# Patient Record
Sex: FEMALE | Race: WHITE | NOT HISPANIC OR LATINO | URBAN - NONMETROPOLITAN AREA
[De-identification: names, ages, dates, MRNs, and addresses within clinical notes are randomized per-mention and may not be internally consistent; named-entity substitution may affect disease eponyms.]

---

## 2019-07-18 ENCOUNTER — IMPORTED ENCOUNTER (OUTPATIENT)
Dept: URBAN - NONMETROPOLITAN AREA CLINIC 1 | Facility: CLINIC | Age: 64
End: 2019-07-18

## 2019-07-18 PROCEDURE — 92015 DETERMINE REFRACTIVE STATE: CPT

## 2019-07-18 PROCEDURE — 92014 COMPRE OPH EXAM EST PT 1/>: CPT

## 2019-07-18 NOTE — PATIENT DISCUSSION
"BF rx given. RTC x one year.; 's Notes:  affairs st CHRISTY - ""Rodrigo Nguyen. From [facility]. "

## 2019-08-29 ENCOUNTER — IMPORTED ENCOUNTER (OUTPATIENT)
Dept: URBAN - NONMETROPOLITAN AREA CLINIC 1 | Facility: CLINIC | Age: 64
End: 2019-08-29

## 2019-08-29 PROBLEM — H43.813: Noted: 2019-08-29

## 2019-08-29 PROBLEM — H52.4: Noted: 2019-08-29

## 2019-08-29 PROBLEM — H16.223: Noted: 2019-08-29

## 2019-08-29 PROBLEM — H40.003: Noted: 2019-08-29

## 2019-08-29 PROBLEM — H52.13: Noted: 2019-08-29

## 2019-08-29 PROBLEM — H43.812: Noted: 2019-08-29

## 2019-08-29 PROCEDURE — 92012 INTRM OPH EXAM EST PATIENT: CPT

## 2019-08-29 NOTE — PATIENT DISCUSSION
"BF rx given. RTC x one year. 8/29/2019  Partial PVD nasally LE. Discussed. Do daily home vision testing. RTC x one month for recheck and sooner if any worse.; 's Notes:  affairs st [facility] - ""Neri Hummel. From [facility]. "

## 2020-07-13 ENCOUNTER — IMPORTED ENCOUNTER (OUTPATIENT)
Dept: URBAN - NONMETROPOLITAN AREA CLINIC 1 | Facility: CLINIC | Age: 65
End: 2020-07-13

## 2020-07-13 PROCEDURE — 92015 DETERMINE REFRACTIVE STATE: CPT

## 2020-07-13 PROCEDURE — 92014 COMPRE OPH EXAM EST PT 1/>: CPT

## 2020-07-13 NOTE — PATIENT DISCUSSION
"PVD os-Retina flat 360 with no breaks tears or heme.-S&S of RD/RT reviewed with pt.-Stressed that pt should contact our office right away with any changes or increase in symptoms. Myopia-Discussed diagnosis with patient. -Explained that people who are myopic are at a higher risk for developing RD/RT and reviewed associated S&S.-Pt to contact our office if symptoms develop. Updated spec Rx given. Recommend lens that will provide comfort as well as protect safety and health of eyes.; Dr's Notes:  affairs st [facility] - ""Oumar Adamson. From [facility]. "

## 2021-06-14 NOTE — PATIENT DISCUSSION
DRY EYE SYNDROME/MGD OU: EDUCATED PATIENT ON FINDINGS. PRESCRIBE ARTIFICIAL TEARS BID/PRN OU AND WARM COMPRESSES QD-BID OU. ADVISED TO RTC IF SI/SX PERSIST OR WORSEN. MONITOR.

## 2021-06-14 NOTE — PATIENT DISCUSSION
GLAUCOMA SUSPECT OU: EDUCATED PATIENT ON FINDINGS. IOP WITHIN NORMAL LIMITS OU WITHOUT GLAUCOMATOUS THINNING ON OCT RNFL TODAY. NO TREATMENT INDICATED AT THIS TIME. MONITOR YEARLY.

## 2021-07-29 ENCOUNTER — IMPORTED ENCOUNTER (OUTPATIENT)
Dept: URBAN - NONMETROPOLITAN AREA CLINIC 1 | Facility: CLINIC | Age: 66
End: 2021-07-29

## 2021-07-29 PROCEDURE — 92014 COMPRE OPH EXAM EST PT 1/>: CPT

## 2021-07-29 PROCEDURE — 92015 DETERMINE REFRACTIVE STATE: CPT

## 2021-07-29 NOTE — PATIENT DISCUSSION
"PVD os-Retina flat 360 with no breaks tears or heme.-S&S of RD/RT reviewed with pt.-Stressed that pt should contact our office right away with any changes or increase in symptoms. Myopia-Discussed diagnosis with patient. -Explained that people who are myopic are at a higher risk for developing RD/RT and reviewed associated S&S.-Pt to contact our office if symptoms develop. Updated spec Rx given. Recommend lens that will provide comfort as well as protect safety and health of eyes.; Dr's Notes:  affairs st [facility] - ""Radha Gunn. From [facility]. "

## 2022-04-09 ASSESSMENT — VISUAL ACUITY
OU_CC: J1+
OU_SC: 20/25
OU_CC: J1+
OS_SC: 20/25
OS_SC: 20/25
OS_SC: 20/20
OD_CC: 20/40
OD_SC: 20/25
OS_CC: 20/40
OS_SC: 20/20
OD_SC: 20/20
OU_CC: 20/30-1
OD_SC: 20/20
OD_SC: 20/20

## 2022-04-09 ASSESSMENT — TONOMETRY
OD_IOP_MMHG: 17
OS_IOP_MMHG: 17
OD_IOP_MMHG: 17
OS_IOP_MMHG: 17
OS_IOP_MMHG: 17
OD_IOP_MMHG: 17
OS_IOP_MMHG: 16
OD_IOP_MMHG: 17

## 2022-08-05 ENCOUNTER — COMPREHENSIVE EXAM (OUTPATIENT)
Dept: RURAL CLINIC 1 | Facility: CLINIC | Age: 67
End: 2022-08-05

## 2022-08-05 DIAGNOSIS — H52.4: ICD-10-CM

## 2022-08-05 DIAGNOSIS — H52.13: ICD-10-CM

## 2022-08-05 DIAGNOSIS — H52.223: ICD-10-CM

## 2022-08-05 PROCEDURE — 92015 DETERMINE REFRACTIVE STATE: CPT

## 2022-08-05 PROCEDURE — 92014 COMPRE OPH EXAM EST PT 1/>: CPT

## 2022-08-05 ASSESSMENT — VISUAL ACUITY
OS_CC: 20/30+1
OD_CC: 20/20

## 2022-08-05 ASSESSMENT — TONOMETRY
OD_IOP_MMHG: 16
OS_IOP_MMHG: 17

## 2023-10-31 ENCOUNTER — COMPREHENSIVE EXAM (OUTPATIENT)
Dept: RURAL CLINIC 1 | Facility: CLINIC | Age: 68
End: 2023-10-31

## 2023-10-31 DIAGNOSIS — H16.223: ICD-10-CM

## 2023-10-31 DIAGNOSIS — H43.813: ICD-10-CM

## 2023-10-31 DIAGNOSIS — H40.013: ICD-10-CM

## 2023-10-31 DIAGNOSIS — H25.13: ICD-10-CM

## 2023-10-31 PROCEDURE — 92014 COMPRE OPH EXAM EST PT 1/>: CPT

## 2023-10-31 ASSESSMENT — VISUAL ACUITY
OS_CC: 20/30
OD_CC: 20/25
OS_BAT: 20/40
OD_BAT: 20/40

## 2023-10-31 ASSESSMENT — TONOMETRY
OD_IOP_MMHG: 15
OS_IOP_MMHG: 17

## 2023-12-07 ENCOUNTER — CONSULTATION/EVALUATION (OUTPATIENT)
Dept: RURAL CLINIC 1 | Facility: CLINIC | Age: 68
End: 2023-12-07

## 2023-12-07 DIAGNOSIS — H25.812: ICD-10-CM

## 2023-12-07 DIAGNOSIS — H25.11: ICD-10-CM

## 2023-12-07 PROCEDURE — 92025 CPTRIZED CORNEAL TOPOGRAPHY: CPT

## 2023-12-07 PROCEDURE — 92136 OPHTHALMIC BIOMETRY: CPT

## 2023-12-07 PROCEDURE — 99214 OFFICE O/P EST MOD 30 MIN: CPT

## 2023-12-07 PROCEDURE — 92134 CPTRZ OPH DX IMG PST SGM RTA: CPT

## 2023-12-07 ASSESSMENT — VISUAL ACUITY
OD_PAM: 20/20-2
OD_CC: 20/20-2
OD_PH: 20/20
OS_CC: 20/40
OD_BAT: 20/100
OD_CC: 20/25-1
OS_CC: 20/25
OS_AM: 20/30
OS_PH: 20/30-1

## 2023-12-07 ASSESSMENT — TONOMETRY
OD_IOP_MMHG: 16
OS_IOP_MMHG: 16

## 2024-01-15 ENCOUNTER — PRE-OP/H&P (OUTPATIENT)
Dept: RURAL CLINIC 1 | Facility: CLINIC | Age: 69
End: 2024-01-15

## 2024-01-15 VITALS
WEIGHT: 160 LBS | SYSTOLIC BLOOD PRESSURE: 141 MMHG | HEART RATE: 82 BPM | DIASTOLIC BLOOD PRESSURE: 88 MMHG | BODY MASS INDEX: 29.44 KG/M2 | HEIGHT: 62 IN

## 2024-01-15 DIAGNOSIS — Z01.818: ICD-10-CM

## 2024-01-15 PROCEDURE — 99499 UNLISTED E&M SERVICE: CPT

## 2024-01-23 ENCOUNTER — SURGERY/PROCEDURE (OUTPATIENT)
Dept: URBAN - METROPOLITAN AREA SURGERY 3 | Facility: SURGERY | Age: 69
End: 2024-01-23

## 2024-01-23 DIAGNOSIS — H25.812: ICD-10-CM

## 2024-01-23 PROCEDURE — 68841 INSJ RX ELUT IMPLT LAC CANAL: CPT

## 2024-01-23 PROCEDURE — 66984 XCAPSL CTRC RMVL W/O ECP: CPT

## 2024-01-24 ENCOUNTER — POST-OP (OUTPATIENT)
Dept: RURAL CLINIC 1 | Facility: CLINIC | Age: 69
End: 2024-01-24

## 2024-01-24 DIAGNOSIS — H25.812: ICD-10-CM

## 2024-01-24 DIAGNOSIS — Z96.1: ICD-10-CM

## 2024-01-24 PROCEDURE — 99024 POSTOP FOLLOW-UP VISIT: CPT

## 2024-01-24 ASSESSMENT — TONOMETRY: OS_IOP_MMHG: 16

## 2024-01-24 ASSESSMENT — VISUAL ACUITY
OS_SC: 20/60-1
OS_SC: 20/70

## 2024-01-30 ENCOUNTER — POST OP/EVAL OF SECOND EYE (OUTPATIENT)
Dept: RURAL CLINIC 1 | Facility: CLINIC | Age: 69
End: 2024-01-30

## 2024-01-30 DIAGNOSIS — Z96.1: ICD-10-CM

## 2024-01-30 DIAGNOSIS — H25.811: ICD-10-CM

## 2024-01-30 PROCEDURE — 99213 OFFICE O/P EST LOW 20 MIN: CPT

## 2024-01-30 ASSESSMENT — VISUAL ACUITY
OS_SC: 20/30
OD_SC: 20/40

## 2024-01-30 ASSESSMENT — TONOMETRY
OD_IOP_MMHG: 14
OS_IOP_MMHG: 14

## 2024-02-13 ENCOUNTER — SURGERY/PROCEDURE (OUTPATIENT)
Dept: URBAN - METROPOLITAN AREA SURGERY 3 | Facility: SURGERY | Age: 69
End: 2024-02-13

## 2024-02-13 DIAGNOSIS — H25.811: ICD-10-CM

## 2024-02-13 PROCEDURE — 68841 INSJ RX ELUT IMPLT LAC CANAL: CPT

## 2024-02-13 PROCEDURE — 66984 XCAPSL CTRC RMVL W/O ECP: CPT

## 2024-02-14 ENCOUNTER — POST-OP (OUTPATIENT)
Dept: RURAL CLINIC 1 | Facility: CLINIC | Age: 69
End: 2024-02-14

## 2024-02-14 DIAGNOSIS — Z96.1: ICD-10-CM

## 2024-02-14 PROCEDURE — 99024 POSTOP FOLLOW-UP VISIT: CPT

## 2024-02-14 ASSESSMENT — TONOMETRY
OD_IOP_MMHG: 20
OS_IOP_MMHG: 18

## 2024-02-14 ASSESSMENT — VISUAL ACUITY
OD_SC: 20/25
OS_SC: 20/40

## 2024-02-20 ENCOUNTER — POST-OP (OUTPATIENT)
Dept: RURAL CLINIC 1 | Facility: CLINIC | Age: 69
End: 2024-02-20

## 2024-02-20 DIAGNOSIS — Z96.1: ICD-10-CM

## 2024-02-20 PROCEDURE — 99024 POSTOP FOLLOW-UP VISIT: CPT

## 2024-02-20 ASSESSMENT — VISUAL ACUITY
OS_SC: 20/40
OU_SC: 20/25-1
OS_SC: 20/30
OD_SC: 20/30
OD_SC: 20/30
OU_SC: 20/30

## 2024-02-20 ASSESSMENT — TONOMETRY
OD_IOP_MMHG: 15
OS_IOP_MMHG: 15

## 2024-07-08 ENCOUNTER — ESTABLISHED PATIENT (OUTPATIENT)
Dept: RURAL CLINIC 1 | Facility: CLINIC | Age: 69
End: 2024-07-08

## 2024-07-08 DIAGNOSIS — H16.223: ICD-10-CM

## 2024-07-08 DIAGNOSIS — H43.813: ICD-10-CM

## 2024-07-08 DIAGNOSIS — H40.013: ICD-10-CM

## 2024-07-08 DIAGNOSIS — Z96.1: ICD-10-CM

## 2024-07-08 PROCEDURE — 92014 COMPRE OPH EXAM EST PT 1/>: CPT

## 2024-07-08 ASSESSMENT — TONOMETRY
OD_IOP_MMHG: 14
OS_IOP_MMHG: 13

## 2024-07-08 ASSESSMENT — VISUAL ACUITY
OS_CC: 20/20
OS_PH: 20/25-2
OS_SC: 20/40-1
OD_CC: 20/20
OU_SC: 20/25-1
OU_CC: 20/20
OD_SC: 20/30-2